# Patient Record
Sex: MALE | Race: WHITE
[De-identification: names, ages, dates, MRNs, and addresses within clinical notes are randomized per-mention and may not be internally consistent; named-entity substitution may affect disease eponyms.]

---

## 2018-04-22 ENCOUNTER — HOSPITAL ENCOUNTER (EMERGENCY)
Dept: HOSPITAL 17 - NEPE | Age: 38
Discharge: HOME | End: 2018-04-22
Payer: COMMERCIAL

## 2018-04-22 VITALS — WEIGHT: 163.14 LBS | HEIGHT: 71 IN | BODY MASS INDEX: 22.84 KG/M2

## 2018-04-22 VITALS
TEMPERATURE: 98.5 F | SYSTOLIC BLOOD PRESSURE: 109 MMHG | RESPIRATION RATE: 16 BRPM | DIASTOLIC BLOOD PRESSURE: 70 MMHG | OXYGEN SATURATION: 97 % | HEART RATE: 56 BPM

## 2018-04-22 DIAGNOSIS — R11.0: ICD-10-CM

## 2018-04-22 DIAGNOSIS — R51: Primary | ICD-10-CM

## 2018-04-22 DIAGNOSIS — Z72.0: ICD-10-CM

## 2018-04-22 PROCEDURE — 96375 TX/PRO/DX INJ NEW DRUG ADDON: CPT

## 2018-04-22 PROCEDURE — 96374 THER/PROPH/DIAG INJ IV PUSH: CPT

## 2018-04-22 PROCEDURE — 99284 EMERGENCY DEPT VISIT MOD MDM: CPT

## 2018-04-22 NOTE — PD
HPI


Chief Complaint:  Headache


Time Seen by Provider:  20:57


Travel History


International Travel<30 days:  No


Contact w/Intl Traveler<30days:  No


Traveled to known affect area:  No





History of Present Illness


HPI


37-year-old male complains headache.  Patient states that headaches started 

several hours prior to arrival.  Patient has history of recurrent migraine 

headache.  Patient states the headache aching headache diffuse over the head.  

Patient denies any visual change.  Patient complained of photophobia.  Patient 

denies any neck pain.  Patient denies any chest pain or shortness of breath.  

Patient denies abdominal pain.  Patient states that he has nausea with no 

vomiting.  Patient denies any focal weakness or numbness of extremity.  Patient 

states that he was given prescription with triptan in the past without much 

relief of the headache.  Patient used to take over-the-counter Excedrin for 

migraine headache.  On a scale from 1-10 headache is a 9.  Patient states that 

headache this time is the same headache he had in the past.





PFSH


Past Medical History


Diminished Hearing:  No


Migraines:  Yes





Past Surgical History


Surgical History:  No Previous Surgery





Social History


Alcohol Use:  No


Tobacco Use:  Yes


Substance Use:  No





Allergies-Medications


(Allergen,Severity, Reaction):  


Coded Allergies:  


     No Known Allergies (Unverified , 4/22/18)


Reported Meds & Prescriptions





Reported Meds & Active Scripts


Active


Zofran Odt (Ondansetron Odt) 4 Mg Tab 4 Mg SL Q6HR PRN


Tramadol (Tramadol HCl) 50 Mg Tab 50 Mg PO Q6H PRN








Review of Systems


General / Constitutional:  No: Fever


Eyes:  Positive: Photophobia, No: Visual changes


HENT:  Positive: Headaches


Cardiovascular:  No: Chest Pain or Discomfort


Respiratory:  No: Shortness of Breath


Gastrointestinal:  No: Abdominal Pain


Genitourinary:  No: Dysuria


Musculoskeletal:  No: Pain


Skin:  No Rash


Neurologic:  No: Weakness


Psychiatric:  No: Depression


Endocrine:  No: Polydipsia


Hematologic/Lymphatic:  No: Easy Bruising





Physical Exam


Narrative


GENERAL: Well-nourished, well-developed patient.


SKIN: Focused skin assessment warm/dry.


HEAD: Normocephalic.


EYES: No scleral icterus. No injection or drainage. 


NECK: Supple, trachea midline. No JVD or lymphadenopathy.


CARDIOVASCULAR: Regular rate and rhythm without murmurs, gallops, or rubs. 


RESPIRATORY: Breath sounds equal bilaterally. No accessory muscle use.


GASTROINTESTINAL: Abdomen soft, non-tender, nondistended. 


MUSCULOSKELETAL: No cyanosis, or edema. 


BACK: Nontender without obvious deformity. No CVA tenderness.


Neurologic exam: Patient awake and alert oriented 3.  No obvious focal 

neurologic deficit.





Data


Data


Last Documented VS





Vital Signs








  Date Time  Temp Pulse Resp B/P (MAP) Pulse Ox O2 Delivery O2 Flow Rate FiO2


 


4/22/18 19:45 98.5 56 16 109/70 (83) 97 Room Air  








Orders





 Orders


Ketorolac Inj (Toradol Inj) (4/22/18 21:15)


Diphenhydramine Inj (Benadryl Inj) (4/22/18 21:15)


Ondansetron Inj (Zofran Inj) (4/22/18 21:15)








MDM


Medical Decision Making


Medical Screen Exam Complete:  Yes


Emergency Medical Condition:  Yes


Differential Diagnosis


Differential diagnosis including migraine headache, tension headache, cluster 

headache.


Narrative Course


37-year-old male with headache.  History of migraine headache.  Toradol 30 mg 

IV.  Benadryl 25 g IV.  Zofran 4 mg IV.





Diagnosis





 Primary Impression:  


 Cephalgia


 Qualified Codes:  R51 - Headache


Patient Instructions:  General Instructions





***Additional Instructions:  


Tramadol as needed for headache.  No Zofran as needed for nausea.  Follow-up 

with personal physician.  Return if worse.


***Med/Other Pt SpecificInfo:  Prescription(s) given


Scripts


Rizatriptan (Maxalt) 10 Mg Tab


1 TAB PO DAILY for Headaches, #6


   Prov: Humble Cool MD         4/22/18 


Ondansetron Odt (Zofran Odt) 4 Mg Tab


4 MG SL Q6HR Y for Nausea/Vomiting, #10 TAB 0 Refills


   Prov: Humble Cool MD         4/22/18 


Tramadol (Tramadol) 50 Mg Tab


50 MG PO Q6H Y for HEADACHE, #12 TAB 0 Refills


   Prov: Humble Cool MD         4/22/18


Disposition:  01 DISCHARGE HOME


Condition:  Stable











Humble Cool MD Apr 22, 2018 21:11